# Patient Record
Sex: FEMALE | Race: WHITE | NOT HISPANIC OR LATINO
[De-identification: names, ages, dates, MRNs, and addresses within clinical notes are randomized per-mention and may not be internally consistent; named-entity substitution may affect disease eponyms.]

---

## 2023-12-11 PROBLEM — Z00.00 ENCOUNTER FOR PREVENTIVE HEALTH EXAMINATION: Status: ACTIVE | Noted: 2023-12-11

## 2023-12-12 ENCOUNTER — APPOINTMENT (OUTPATIENT)
Dept: OBGYN | Facility: CLINIC | Age: 53
End: 2023-12-12
Payer: MEDICAID

## 2023-12-12 ENCOUNTER — NON-APPOINTMENT (OUTPATIENT)
Age: 53
End: 2023-12-12

## 2023-12-12 VITALS — SYSTOLIC BLOOD PRESSURE: 110 MMHG | DIASTOLIC BLOOD PRESSURE: 70 MMHG | WEIGHT: 149 LBS

## 2023-12-12 DIAGNOSIS — Z01.419 ENCOUNTER FOR GYNECOLOGICAL EXAMINATION (GENERAL) (ROUTINE) W/OUT ABNORMAL FINDINGS: ICD-10-CM

## 2023-12-12 PROCEDURE — 99386 PREV VISIT NEW AGE 40-64: CPT

## 2023-12-14 ENCOUNTER — NON-APPOINTMENT (OUTPATIENT)
Age: 53
End: 2023-12-14

## 2023-12-14 LAB
HPV 16 E6+E7 MRNA CVX QL NAA+PROBE: NOT DETECTED
HPV HIGH+LOW RISK DNA PNL CVX: NOT DETECTED
HPV18+45 E6+E7 MRNA CVX QL NAA+PROBE: NOT DETECTED

## 2023-12-20 ENCOUNTER — NON-APPOINTMENT (OUTPATIENT)
Age: 53
End: 2023-12-20

## 2023-12-20 LAB — CYTOLOGY CVX/VAG DOC THIN PREP: ABNORMAL

## 2024-01-02 PROBLEM — Z01.419 ENCOUNTER FOR GYNECOLOGICAL EXAMINATION WITH PAPANICOLAOU SMEAR OF CERVIX: Status: ACTIVE | Noted: 2024-01-02

## 2024-01-02 NOTE — HISTORY OF PRESENT ILLNESS
[FreeTextEntry1] : Patient is a postmenopausal 52yo  presents for an initial office visit without complaints. She is not sexually active. She underwent menopause 10 years ago and denies vaginal bleeding. She denies abnormal discharge, dysuria, vaginal pruritis. She is a recent immigrant from [place] and just established care with a PCP and was diagnosed with HTN, HLD, and prediabetes. Her PCP referred her here. She has never had a colonoscopy, mammogram was over 5 years ago, and last pap was at least two years ago. She denies history of abnormal pap or abnormal mammogram.   OBHx: G1  . G2 VTOP D&C . G3  . G4 SAB  GYNHx: history of ovarian cysts (see PSH). Denies abnormal pap, STI, fibroids PSH: Open left oophorectomy ~, laparoscopic right ovarian cystectomy ~ PMHx: HTN, HLD, prediabetes  meds: amlodipine, atorvastatin, "blood thinner" - doesn't know the name, not aspirin social: denies tobacco, alcohol use. Denies recreational drugs.  [Mammogramdate] : 2016 [PapSmeardate] : 2021 [PGHxTotal] : 4 [PGHxAbortions] : 1 [[facility]] : 2 [PGHxABSpont] : 1

## 2024-01-02 NOTE — PLAN
[FreeTextEntry1] : - Recent [place] immigrant here to establish care, overdue for health maintenance screenings but with no active complaints, normal physical exam - Pap collected, will f/u results - Referral given for mammography and colonoscopy - Pt to follow up in one year or sooner if symptoms/concerns arise  Adonay Caldwell PGY1

## 2024-12-15 ENCOUNTER — EMERGENCY (EMERGENCY)
Facility: HOSPITAL | Age: 54
LOS: 1 days | Discharge: HOME | End: 2024-12-15
Admitting: EMERGENCY MEDICINE
Payer: COMMERCIAL

## 2024-12-15 VITALS
DIASTOLIC BLOOD PRESSURE: 73 MMHG | OXYGEN SATURATION: 99 % | RESPIRATION RATE: 18 BRPM | HEART RATE: 70 BPM | SYSTOLIC BLOOD PRESSURE: 158 MMHG | TEMPERATURE: 98 F

## 2024-12-15 PROCEDURE — 99284 EMERGENCY DEPT VISIT MOD MDM: CPT

## 2024-12-15 PROCEDURE — 96372 THER/PROPH/DIAG INJ SC/IM: CPT

## 2024-12-15 PROCEDURE — 99284 EMERGENCY DEPT VISIT MOD MDM: CPT | Mod: 25

## 2024-12-15 PROCEDURE — 73060 X-RAY EXAM OF HUMERUS: CPT | Mod: 26,LT

## 2024-12-15 PROCEDURE — 73060 X-RAY EXAM OF HUMERUS: CPT

## 2024-12-15 PROCEDURE — 73030 X-RAY EXAM OF SHOULDER: CPT

## 2024-12-15 PROCEDURE — 82962 GLUCOSE BLOOD TEST: CPT

## 2024-12-15 PROCEDURE — 73030 X-RAY EXAM OF SHOULDER: CPT | Mod: 26

## 2024-12-15 RX ORDER — KETOROLAC TROMETHAMINE 30 MG/ML
15 INJECTION INTRAMUSCULAR; INTRAVENOUS ONCE
Refills: 0 | Status: DISCONTINUED | OUTPATIENT
Start: 2024-12-15 | End: 2024-12-15

## 2024-12-15 RX ORDER — OXYCODONE HYDROCHLORIDE 30 MG/1
1 TABLET ORAL
Qty: 9 | Refills: 0
Start: 2024-12-15 | End: 2024-12-17

## 2024-12-15 RX ADMIN — KETOROLAC TROMETHAMINE 15 MILLIGRAM(S): 30 INJECTION INTRAMUSCULAR; INTRAVENOUS at 21:04

## 2024-12-15 NOTE — ED ADULT TRIAGE NOTE - MODE OF ARRIVAL
Pt comes from home with c/o alcohol withdrawals/anxiety x 1 wk, +abd pain/SOB, nausea, headache.  Denies CP.   Last drink 1 day ago, beers only. Denies drug use.     Pt appears anxious. Denies SI/HI. No hallucinations.    Walk in

## 2024-12-15 NOTE — ED PROVIDER NOTE - CLINICAL SUMMARY MEDICAL DECISION MAKING FREE TEXT BOX
54 F RHD p/w L shoulder pain s/p mechanical fall- no head trauma or loc.  on exam pt uncomfortable but in nad, no midline spinal ttp, Ext: + ttp and swelling over L upper arm, no deformity but unable to range L shoulder, otherwise FROM throughout w/ no other joint ttp, SILT equal throughout, distal pulses 2+.  suspect proximal humerus fracture, less likely dislocation.  will obtain xray L shoulder and give toradol for pain.    xray w/ proximal humerus fracture, no dislocation.  pt w/ vasovagal near syncope episode 2/2 pain from fx but since resolved and feeling well and FS wnl, bp stable.  placed in sling and educated in RICE.  f/u with ortho outpt.  discussed strict return parameters

## 2024-12-15 NOTE — ED PROVIDER NOTE - NSFOLLOWUPINSTRUCTIONS_ED_ALL_ED_FT
Take tylenol 650mg or motrin 400-800mg as needed every 4-6 hours for pain.   Take oxycodone as prescribed for severe pain   REST- Rest your hurting/injured joint or extremity to decrease pain and swelling for 24-48 hours    ICE- Apply ice to area of pain to decreased inflammation and pain, put towel/barrier between ice and skin. You can keep ice on for 20 minutes at a time 4-8 times daily   COMPRESSION- Wear ace wrap or brace for support to reduce swelling.  Make sure not to wrap too tight, loosen if skin feeling numb/tingling or skin turns blue   ELEVATION- Elevate hurting/injured area 6 or more inches about level of heart to decrease swelling/inflammation.  Use pillow under joint to elevate area    Make sure to wear sling and do not lift anything with left arm    [facility] (Thursday afternoons) - call [phone] to schedule    Humerus Fracture Treated With Immobilization  Right arm and hand showing skeleton with a humerus fracture.  A humerus fracture is a break in the large bone in the upper arm (humerus). If the joint is stable and the bones are still in their normal position (nondisplaced), the injury may be treated with immobilization. This involves the use of a cast, splint, or sling to hold your arm in place. Immobilization ensures that your bones continue to stay in the correct position while your arm is healing.    What are the causes?  This condition may be caused by:  A fall.  A hard, direct hit to the arm.  A motor vehicle accident.  What increases the risk?  The following factors may make you more likely to develop this condition:  Having a disease that makes the bones thin and weak.  Being elderly.  What are the signs or symptoms?  Symptoms of this condition include:  Pain.  Swelling.  Bruising.  Not being able to move your arm normally.  How is this diagnosed?  This condition may be diagnosed based on:  A physical exam.  X-rays of your upper arm, elbow, and shoulder.  CT scan.  How is this treated?  Treatment for this condition involves wearing a cast, splint, or sling until the injured area is stable enough for you to begin range-of-motion exercises. You may also be prescribed pain medicine.    Follow these instructions at home:  If you have a nonremovable cast or splint:    Do not put pressure on any part of the cast or splint until it is fully hardened. This may take several hours.  Do not stick anything inside the cast or splint to scratch your skin. Doing that increases your risk of infection.  Check the skin around the cast or splint every day. Tell your health care provider about any concerns.  You may put lotion on dry skin around the edges of the cast or splint. Do not put lotion on the skin underneath the cast or splint.  Keep the cast or splint clean and dry.  If you have a removable splint or sling:    Wear the splint or sling as told by your health care provider. Remove it only as told by your health care provider.  Check the skin around the splint or sling every day. Tell your health care provider about any concerns.  Loosen the splint or sling if your fingers tingle, become numb, or turn cold and blue.  Keep the splint or sling clean and dry.  Bathing    Do not take baths, swim, or use a hot tub until your health care provider approves. Ask your health care provider if you may take showers. You may only be allowed to take sponge baths.  If the cast, splint, or sling is not waterproof:  Do not let it get wet.  Cover it with a watertight covering when you take a bath or shower.  Managing pain, stiffness, and swelling    Bag of ice on a towel on the skin.   If directed, put ice on the injured area. To do this:  If you have a removable splint or sling, remove it as told by your health care provider.  Put ice in a plastic bag.  Place a towel between your skin and the bag or between your nonremovable cast or sling and the bag.  Leave the ice on for 20 minutes, 2–3 times a day.  Remove the ice if your skin turns bright red. This is very important. If you cannot feel pain, heat, or cold, you have a greater risk of damage to the area.  Move your fingers often to reduce stiffness and swelling.  Raise the injured area above the level of your heart while you are sitting or lying down.  Driving    Do not drive or operate machinery while taking prescription pain medicine.  Ask your health care provider when it is safe to drive if you have a cast, splint, or sling on your arm.  Activity    Do not lift anything until your health care provider says that it is safe.  Return to your normal activities as told by your health care provider. Ask your health care provider what activities are safe for you.  Do range-of-motion exercises only as told by your health care provider or physical therapist.  General instructions    Do not use any products that contain nicotine or tobacco. These products include cigarettes, chewing tobacco, and vaping devices, such as e-cigarettes. These can delay bone healing. If you need help quitting, ask your health care provider.  Take over-the-counter and prescription medicines only as told by your health care provider.  Ask your health care provider if the medicine prescribed to you:  Can cause constipation. You may need to take these actions to prevent or treat constipation:  Drink enough fluid to keep your urine pale yellow.  Take over-the-counter or prescription medicines.  Eat foods that are high in fiber, such as beans, whole grains, and fresh fruits and vegetables.  Limit foods that are high in fat and processed sugars, such as fried or sweet foods.  Keep all follow-up visits. This is important.  Contact a health care provider if:  You have any new pain, swelling, or bruising.  Your pain, swelling, and bruising do not improve.  Your cast, splint, or sling becomes loose or damaged.  Get help right away if:  Your skin or fingers on your injured arm turn blue or gray.  Your arm feels cold or numb.  You have severe pain in your injured arm.  Summary  A humerus fracture is a break in the large bone in the upper arm.  Immobilization involves the use of a cast, splint, or sling to hold your arm in place while the injury heals.  Wear a splint or sling as told by your health care provider. Remove it only as told by your health care provider.  Move your fingers often to reduce stiffness and swelling.  This information is not intended to replace advice given to you by your health care provider. Make sure you discuss any questions you have with your health care provider.

## 2024-12-15 NOTE — ED ADULT NURSE NOTE - NSFALLRISKINTERV_ED_ALL_ED

## 2024-12-15 NOTE — ED ADULT NURSE NOTE - OBJECTIVE STATEMENT
Pt is 55 yo F c/o L shoulder pain following slipping on the stairs approx 1 hr pta. Denies hs, loc. thinners.  Pt in NAD, L arm in sling. Ambulatory with steady gait.

## 2024-12-15 NOTE — ED PROVIDER NOTE - PHYSICAL EXAMINATION
Vitals reviewed  Gen: uncomfortable appearing but in nad, speaking in full sentences  Skin: wwp, no rash/lesions  HEENT: ncat, eomi, mmm  Neck/Back: no midline ttp/step off, no paraspinal ttp  CV: rrr, no audible m/r/g  Resp: symmetrical expansion, ctab, no w/r/r  Ext: + ttp and swelling over L upper arm, no deformity but unable to range L shoulder, otherwise FROM throughout w/ no other joint ttp, SILT equal throughout, distal pulses 2+  Neuro: alert/oriented, no focal deficits, steady gait without assistance

## 2024-12-15 NOTE — ED ADULT TRIAGE NOTE - CHIEF COMPLAINT QUOTE
pt slipped and fell on stairs 1 hour ago.  no loc.   c/o left shoulder pain.   unable to abduct arm.   arm sling applied in triage.

## 2024-12-15 NOTE — ED PROVIDER NOTE - PATIENT PORTAL LINK FT
You can access the FollowMyHealth Patient Portal offered by [facility] by registering at the following website: [email or web address]. By joining [facility]’s FollowMyHealth portal, you will also be able to view your health information using other applications (apps) compatible with our system.

## 2024-12-15 NOTE — ED PROVIDER NOTE - OBJECTIVE STATEMENT
[place] speaking- family member used as  pt preference  54 F RHD p/w L shoulder pain s/p mechanical fall.  pt was walking down steps and slipped on last 3 and fell backwards landing on L shoulder- no head trauma or loc.  no use of AC.  reports sharp pain in L shoulder and difficulty moving the arm.  no elbow or wrist pain.  denies neck or back pain.  able to ambulate.  did not take anything for pain.  denies f/c, HA, dizziness, chest pain, sob, nvd, numbness/weakness, paresthesias or other injuries

## 2024-12-17 DIAGNOSIS — S42.202A UNSPECIFIED FRACTURE OF UPPER END OF LEFT HUMERUS, INITIAL ENCOUNTER FOR CLOSED FRACTURE: ICD-10-CM
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
## 2024-12-17 DIAGNOSIS — M25.512 PAIN IN LEFT SHOULDER: ICD-10-CM
